# Patient Record
Sex: FEMALE | HISPANIC OR LATINO | URBAN - METROPOLITAN AREA
[De-identification: names, ages, dates, MRNs, and addresses within clinical notes are randomized per-mention and may not be internally consistent; named-entity substitution may affect disease eponyms.]

---

## 2018-10-02 NOTE — IMPRESSION/PLAN
Impression: Type 2 diab w mild nonprlf diabetic rtnop w/o macular edema, bilateral: X51.3025. Plan: Discussed diagnosis in detail with patient. Emphasized blood sugar control. Will continue to observe condition and or symptoms.

## 2018-10-03 NOTE — IMPRESSION/PLAN
Impression: Combined forms of age-related cataract, left eye: H25.812. Condition: quality of life issue.  Plan:

## 2018-10-03 NOTE — IMPRESSION/PLAN
Impression: Other secondary cataract, right eye: H26.491 OD. .  Visually significant, quality of life issue, could improve with surgery. Plan: Discussed all risks, benefits, alternatives, procedures and recovery. Patient understands changing glasses will not improve vision. Patient desires to have surgery, recommend yag capsulotomy OD. Previous retinal laser could affect vision.

## 2019-01-15 NOTE — IMPRESSION/PLAN
Impression: Type 2 diabetes mellitus w/ proliferative diabetic retinopathy w/o macular edema, right eye: e11.3591. OD. Condition: established, stable. Vision: vision not affected. Plan: Discussed diagnosis in detail with patient. Exam shows minimal Diabetic changes. No treatment is recommended at this time. Emphasized blood sugar control and advised to keep future appointments with PCP and/or Endocrinologist for the management of Diabetes. Recommend observation for now. OCT shows Thinning, no edema OD, VMA/VMT no edema OS. Pt did not want to be dilated today, OS. Pt to return in 2-6 wks for de/oct OS with the understanding that she WILL be dilated OS at that visit.

## 2019-09-20 NOTE — IMPRESSION/PLAN
Impression: Vitreous hemorrhage( Associated with PDR), left eye: H43.12 OS. Condition: unstable. Vision: vision affected. Plan: Advised patient of condition. Discussed diagnosis in detail with patient. Surgical treatment is required.  See notes above

## 2019-09-20 NOTE — IMPRESSION/PLAN
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 Condition: unstable. Vision: vision affected. no prior treatment Plan: Discussed diagnosis in detail with patient. Discussed risks of progression. Surgical treatment is recommended to repair the retina and remove the blood PPVx. Surgical risks and benefits were discussed, explained and understood by patient. Unable to tell how much vision will be recovered. Discussed with patient may need to use a gas bubble to reattach the retina, will determine during surgery when a better view of the retina is possible. Reviewed post-op care if a gas bubble is used: no traveling, flying or high altitude for approximately 6 - 8 weeks. All questions answered. Patient elects to proceed with recommendation. RL1. Educational material provided to patient. 
Erxed drops to pharmacy on file

## 2019-09-20 NOTE — IMPRESSION/PLAN
Impression: Type 2 diabetes mellitus w/ proliferative diabetic retinopathy w/o macular edema, right eye: e11.3591. OD. Condition: established, stable. Vision: vision not affected. Plan: Discussed diagnosis in detail with patient. Exam shows minimal Diabetic changes. No treatment is recommended at this time. Emphasized blood sugar control and advised to keep future appointments with PCP and/or Endocrinologist for the management of Diabetes. Recommend observation for now. OCT shows Thinning, no edema OD Pt did not want to be dilated today, OS. Pt to return in 2-6 wks for de/oct OS with the understanding that she WILL be dilated OS at that visit.

## 2019-09-20 NOTE — IMPRESSION/PLAN
Impression: Puckering of macula, left eye: H35.372 OS. Condition: unstable. Vision: vision affected. Plan: Advised patient of condition. Discussed diagnosis in detail with patient. Surgical treatment is required.  See notes above

## 2019-09-20 NOTE — IMPRESSION/PLAN
Impression: Type 2 diabetes mellitus w/ proliferative diabetic retinopathy w/ macular edema, left eye: E11.3512 OS. Condition: unstable. Vision: vision affected. Plan: Advised patient of condition. Discussed diagnosis in detail with patient. Surgical treatment is required.  see notes above

## 2020-03-04 NOTE — IMPRESSION/PLAN
Impression: Type 2 diabetes mellitus w/ proliferative diabetic retinopathy w/o macular edema, right eye: e11.3591. OD. Condition: established, stable. Vision: vision not affected. Hx of PPVX for PDR / VH OD in the past. Plan: Discussed diagnosis in detail with patient. Exam shows no active Diabetic changes OD. No treatment is recommended at this time. Emphasized blood sugar control and advised to keep future appointments with PCP and/or Endocrinologist for the management of Diabetes. Recommend observation for now. OCT OD is stable.

## 2020-03-04 NOTE — IMPRESSION/PLAN
Impression: Type 2 diabetes mellitus w/ proliferative diabetic retinopathy w/ macular edema, left eye: E11.3512 OS. Condition: stable. Vision: vision affected. s/p PPVX for Repair od TRD OS 11/12/2019 Plan: Discussed diagnosis in detail with patient. Exam OS shows the vitreous is clear and the retina is fully attached and stable. No treatment is required at this time. Patient can proceed with a refraction at this time. OCT OS is stable. Emphasized blood sugar control.

## 2020-10-05 NOTE — IMPRESSION/PLAN
Impression: Aphakia: H27.00. Right.  Plan: no IOL seen with undilated pupil just some PC, rx show aphakia

## 2020-10-05 NOTE — IMPRESSION/PLAN
Impression: Combined forms of age-related cataract, left eye: H25.812. Left.  Plan: refer for cat eval and treatment

## 2020-10-21 NOTE — IMPRESSION/PLAN
Impression: Type 2 diabetes mellitus w/ proliferative diabetic retinopathy w/ macular edema, left eye: E11.3512 OS. Condition: stable. Vision: vision affected. 
s/p PPVX for Repair od TRD OS 11/12/2019 Plan: F/u with Dr Jacqueline Coburn as scheduled

## 2020-10-21 NOTE — IMPRESSION/PLAN
Impression: Age-related hypermature cataract of left eye: H25.22. Condition: quality of life issue. Symptoms: could improve with surgery. Vision: vision affected. Plan: Cataracts appear to be dense enough to account for patient's vision. However, with decreased view into the fundus cannot determine if there is any retinal pathology which may prohibit improved vision post operatively. Patient wishes to proceed with surgery, recommend phacoemulsification with intraocular lens. RL-2 Recommend standard IOL Aim plano.

## 2020-10-21 NOTE — IMPRESSION/PLAN
Impression: Aphakia, right eye: H27.01. Plan: Discussed diagnosis in detail with patient. No progression expected. Will continue to observe condition and or symptoms.

## 2020-11-21 NOTE — IMPRESSION/PLAN
Impression: S/P CE/standard IOL SA60WF +27.00 OS - 1 Day. Presence of intraocular lens  Z96.1. Plan: 1 wk, po2 --Continue Ofloxacin 0.3% x 7 days then d/c
--Taper Prednisolone acetate 1% QID x 1 wk, TID x 1 wk, BID x 1wk, QD x 1wk, then d/c
--Advised patient to use artificial tears for comfort.

## 2020-12-16 NOTE — IMPRESSION/PLAN
Impression: S/P CE/standard IOL SA60WF +27.00 OS - 26 Days. Presence of intraocular lens  Z96.1. Plan: She would like to be fit for aphakic SCL OD to achieve vision equal to OS. Low vision consult recommended post CL fitting.  finish prednisolone taper

## 2021-02-03 NOTE — IMPRESSION/PLAN
Impression: Type 2 diabetes mellitus w/o complication: S98.9. Plan: Discussed diagnosis in detail with patient. Advised and emphasized to the patient about blood sugar control. Discussed risks of progression and poor compliance can lead to blindness. Will continue to observe condition and/or symptoms.

## 2021-03-24 NOTE — IMPRESSION/PLAN
Impression: Aphakia, right eye: H27.01. Plan: Discussed diagnosis in detail with patient. Reassured patient of current condition and treatment. Will continue to observe condition and or symptoms. Will refer to Dr. Karissa Ruffin to see if IOL can be sutured in place OD to improve her va.   Please evaluate visual potential.

## 2021-08-24 NOTE — IMPRESSION/PLAN
Impression: Other secondary cataract, left eye: H26.492. Left. Vision: vision affected. Plan: Discussed diagnosis in detail with patient. Recommend Yag Laser treatment LEFT EYE for possible vision improvement. Discussed risks/benefits of laser TX. All questions answered. Patient elects to proceed with recommendation.  RL1

## 2021-08-24 NOTE — IMPRESSION/PLAN
Impression: Type 2 diab with prolif diab rtnop without macular edema, bi: D04.1568. Bilateral. Condition: stable. Vision: vision affected. Hx of PRP OU, Hx of Repair of TRD OS 11/2019 Plan: Discussed diagnosis in detail with patient. Exam shows no active Diabetic changes OU. No treatment is recommended at this time. Emphasized blood sugar control and advised to keep future appointments with PCP and/or Endocrinologist for the management of Diabetes. Recommend observation for now. OCT OD shows thinning and Optos OD shows heavy PRP - PVD. OCT OS shows no edema and Optos OS shows heavy PRP - PVD. Recommend observation.

## 2021-11-02 NOTE — IMPRESSION/PLAN
Impression: Type 2 diab with prolif diab rtnop without macular edema, bi: G63.5867 Bilateral. Plan: Discussed diagnosis in detail with patient. Advised and emphasized patient of blood sugar control. Discussed risks of progression. Poor compliance can lead to blindness. Optos performed and reviewed with patient today. Reassured patient of condition and treatment. Will continue to observe condition and or symptoms.

## 2021-11-02 NOTE — IMPRESSION/PLAN
Impression: Meibomian gland dysfunction of right eye, unspecified eyelid: H02.883. Plan: Discussed diagnosis in detail with patient. No treatment is required at this time. Reassured patient of current condition and treatment. Will continue to observe condition if symptoms worsen or do not resolve. Advised Pt to use hot compress for 5 min 1-2x day for 1-2 months. Use AT OU.

## 2022-11-01 ENCOUNTER — OFFICE VISIT (OUTPATIENT)
Dept: URBAN - METROPOLITAN AREA CLINIC 23 | Facility: CLINIC | Age: 62
End: 2022-11-01
Payer: COMMERCIAL

## 2022-11-01 DIAGNOSIS — E11.3593 TYPE 2 DIAB WITH PROLIF DIAB RTNOP WITHOUT MACULAR EDEMA, BI: Primary | ICD-10-CM

## 2022-11-01 DIAGNOSIS — H43.812 VITREOUS DEGENERATION, LEFT EYE: ICD-10-CM

## 2022-11-01 DIAGNOSIS — H16.223 KERATOCONJUNCTIVITIS SICCA, NOT SPECIFIED AS SJÖGREN'S, BILATERAL: ICD-10-CM

## 2022-11-01 DIAGNOSIS — H27.01 APHAKIA, RIGHT EYE: ICD-10-CM

## 2022-11-01 PROCEDURE — 99214 OFFICE O/P EST MOD 30 MIN: CPT | Performed by: OPTOMETRIST

## 2022-11-01 PROCEDURE — 92133 CPTRZD OPH DX IMG PST SGM ON: CPT | Performed by: OPTOMETRIST

## 2022-11-01 ASSESSMENT — INTRAOCULAR PRESSURE
OD: 19
OS: 19

## 2022-11-01 ASSESSMENT — KERATOMETRY
OD: 45.50
OS: 46.25

## 2022-11-01 NOTE — IMPRESSION/PLAN
Impression: Type 2 diab with prolif diab rtnop without macular edema, bi: K60.2866 Bilateral. Plan: Discussed diagnosis in detail with patient. Advised and emphasized patient of blood sugar control. Discussed risks of progression. Poor compliance can lead to blindness. Optos performed and reviewed with patient today. Reassured patient of condition and treatment. Will continue to observe condition and or symptoms.

## 2022-11-01 NOTE — IMPRESSION/PLAN
Impression: Aphakia, right eye: H27.01. Plan: Pt edu. Appears stable. Continue to monitor. RTC 1 year DFE.

## 2022-11-01 NOTE — IMPRESSION/PLAN
Impression: Vitreous degeneration, left eye: H43.812. Plan: Pt edu. There is no evidence of retinal pathology. All signs and risks of retinal detachment and tears were discussed in detail. Patient instructed to call the office immediately with any symptoms noted.